# Patient Record
Sex: MALE | Race: WHITE | Employment: FULL TIME | URBAN - METROPOLITAN AREA
[De-identification: names, ages, dates, MRNs, and addresses within clinical notes are randomized per-mention and may not be internally consistent; named-entity substitution may affect disease eponyms.]

---

## 2020-05-02 ENCOUNTER — LAB ENCOUNTER (OUTPATIENT)
Dept: LAB | Age: 60
End: 2020-05-02
Attending: FAMILY MEDICINE
Payer: COMMERCIAL

## 2020-05-02 ENCOUNTER — OFFICE VISIT (OUTPATIENT)
Dept: FAMILY MEDICINE CLINIC | Facility: CLINIC | Age: 60
End: 2020-05-02
Payer: COMMERCIAL

## 2020-05-02 VITALS
DIASTOLIC BLOOD PRESSURE: 80 MMHG | SYSTOLIC BLOOD PRESSURE: 128 MMHG | HEART RATE: 64 BPM | WEIGHT: 232 LBS | BODY MASS INDEX: 34.36 KG/M2 | HEIGHT: 69 IN

## 2020-05-02 DIAGNOSIS — N40.0 PROSTATE HYPERTROPHY: ICD-10-CM

## 2020-05-02 DIAGNOSIS — Z13.220 SCREENING, LIPID: ICD-10-CM

## 2020-05-02 DIAGNOSIS — Z12.5 PROSTATE CANCER SCREENING: ICD-10-CM

## 2020-05-02 DIAGNOSIS — E88.81 INSULIN RESISTANCE: ICD-10-CM

## 2020-05-02 DIAGNOSIS — R35.0 URINARY FREQUENCY: Primary | ICD-10-CM

## 2020-05-02 PROCEDURE — 80061 LIPID PANEL: CPT

## 2020-05-02 PROCEDURE — 82043 UR ALBUMIN QUANTITATIVE: CPT

## 2020-05-02 PROCEDURE — 80053 COMPREHEN METABOLIC PANEL: CPT

## 2020-05-02 PROCEDURE — 81003 URINALYSIS AUTO W/O SCOPE: CPT | Performed by: FAMILY MEDICINE

## 2020-05-02 PROCEDURE — 83036 HEMOGLOBIN GLYCOSYLATED A1C: CPT

## 2020-05-02 PROCEDURE — 85025 COMPLETE CBC W/AUTO DIFF WBC: CPT

## 2020-05-02 PROCEDURE — 84443 ASSAY THYROID STIM HORMONE: CPT

## 2020-05-02 PROCEDURE — 99203 OFFICE O/P NEW LOW 30 MIN: CPT | Performed by: FAMILY MEDICINE

## 2020-05-02 PROCEDURE — 36415 COLL VENOUS BLD VENIPUNCTURE: CPT

## 2020-05-02 PROCEDURE — 82570 ASSAY OF URINE CREATININE: CPT

## 2020-05-02 RX ORDER — TAMSULOSIN HYDROCHLORIDE 0.4 MG/1
1 CAPSULE ORAL DAILY
COMMUNITY
Start: 2020-04-21 | End: 2020-05-04

## 2020-05-02 RX ORDER — METFORMIN HYDROCHLORIDE 500 MG/1
1 TABLET, EXTENDED RELEASE ORAL DAILY
COMMUNITY
Start: 2020-04-21 | End: 2020-05-04

## 2020-05-02 NOTE — PROGRESS NOTES
Cathy Gray is a 62year old male. Patient presents with:  Frequency    HPI:   New to me. Reports sometimes has been urinating a lot but not right now. No burning. Last time labs were drawn over a year ago.  Has been on metformin for about year MICROALB/CREAT RATIO, RANDOM URINE; Future  - HEMOGLOBIN A1C; Future  - TSH W REFLEX TO FREE T4; Future    4. Screening, lipid    - LIPID PANEL; Future    5.  Prostate cancer screening    - PSA SCREEN; Future        The patient indicates understanding of th

## 2020-05-04 DIAGNOSIS — R74.8 ELEVATED LIVER ENZYMES: Primary | ICD-10-CM

## 2020-05-04 RX ORDER — TAMSULOSIN HYDROCHLORIDE 0.4 MG/1
0.4 CAPSULE ORAL DAILY
Qty: 90 CAPSULE | Refills: 4 | Status: SHIPPED | OUTPATIENT
Start: 2020-05-04

## 2020-05-04 RX ORDER — METFORMIN HYDROCHLORIDE 500 MG/1
500 TABLET, EXTENDED RELEASE ORAL
Qty: 90 TABLET | Refills: 4 | Status: SHIPPED | OUTPATIENT
Start: 2020-05-04

## 2020-05-04 NOTE — PROGRESS NOTES
Liver enzymes are elevated - please avoid alcohol - can be causing the increase. Plan to repeat in 1 month. Order has been placed. You do have pre-diabetes.  Ok to continue on the metformin and be better with your diet and more regular exercise for weight l